# Patient Record
Sex: FEMALE | Race: BLACK OR AFRICAN AMERICAN | Employment: FULL TIME | URBAN - METROPOLITAN AREA
[De-identification: names, ages, dates, MRNs, and addresses within clinical notes are randomized per-mention and may not be internally consistent; named-entity substitution may affect disease eponyms.]

---

## 2019-03-04 ENCOUNTER — OFFICE VISIT (OUTPATIENT)
Dept: ORTHOPEDIC SURGERY | Age: 44
End: 2019-03-04
Payer: COMMERCIAL

## 2019-03-04 VITALS — DIASTOLIC BLOOD PRESSURE: 89 MMHG | SYSTOLIC BLOOD PRESSURE: 136 MMHG | WEIGHT: 183 LBS | HEART RATE: 86 BPM

## 2019-03-04 DIAGNOSIS — S63.642A SPRAIN OF METACARPOPHALANGEAL (MCP) JOINT OF LEFT THUMB, INITIAL ENCOUNTER: ICD-10-CM

## 2019-03-04 DIAGNOSIS — M79.645 PAIN OF FINGER OF LEFT HAND: Primary | ICD-10-CM

## 2019-03-04 PROCEDURE — 99203 OFFICE O/P NEW LOW 30 MIN: CPT | Performed by: ORTHOPAEDIC SURGERY

## 2019-04-15 ENCOUNTER — OFFICE VISIT (OUTPATIENT)
Dept: ORTHOPEDIC SURGERY | Age: 44
End: 2019-04-15
Payer: COMMERCIAL

## 2019-04-15 VITALS — BODY MASS INDEX: 33.13 KG/M2 | WEIGHT: 180 LBS | HEIGHT: 62 IN

## 2019-04-15 DIAGNOSIS — S63.642A SPRAIN OF METACARPOPHALANGEAL (MCP) JOINT OF LEFT THUMB, INITIAL ENCOUNTER: Primary | ICD-10-CM

## 2019-04-15 DIAGNOSIS — M79.645 PAIN OF FINGER OF LEFT HAND: ICD-10-CM

## 2019-04-15 PROCEDURE — 99212 OFFICE O/P EST SF 10 MIN: CPT | Performed by: ORTHOPAEDIC SURGERY

## 2019-04-15 NOTE — PROGRESS NOTES
Is returns today for left thumb carpophalangeal sprain. She is doing well. Today she can purchase of up on the exam table without pain. She has no swelling or ecchymosis. His no tenderness medially or laterally. She has no volar tenderness. She has normal pull-through his DIP and has normal motion of the MP. She get her thumb pad down to the proximal wrist crease in both hands. Sensibility is normal on both sides of the thumb. Her medial and lateral stability matches her opposite thumb. Impression resolved left thumb sprain. Plan: She may be discharged from care.

## 2019-08-02 ENCOUNTER — TELEPHONE (OUTPATIENT)
Dept: ORTHOPEDIC SURGERY | Age: 44
End: 2019-08-02

## 2019-08-05 ENCOUNTER — TELEPHONE (OUTPATIENT)
Dept: ORTHOPEDIC SURGERY | Age: 44
End: 2019-08-05

## 2019-08-09 LAB
CANDIDA SPECIES, DNA PROBE: ABNORMAL
GARDNERELLA VAGINALIS, DNA PROBE: ABNORMAL
TRICHOMONAS VAGINALIS DNA: ABNORMAL

## 2020-05-26 LAB
HPV COMMENT: NORMAL
HPV TYPE 16: NOT DETECTED
HPV TYPE 18: NOT DETECTED
HPVOH (OTHER TYPES): NOT DETECTED

## 2020-07-21 ENCOUNTER — OFFICE VISIT (OUTPATIENT)
Dept: PRIMARY CARE CLINIC | Age: 45
End: 2020-07-21
Payer: COMMERCIAL

## 2020-07-21 PROCEDURE — 99211 OFF/OP EST MAY X REQ PHY/QHP: CPT | Performed by: NURSE PRACTITIONER

## 2020-07-21 NOTE — PROGRESS NOTES
[facility] for [facility]. They were educated on isolation and quarantine as appropriate. For any symptoms, they were directed to seek care from their PCP, given contact information to establish with a doctor, directed to an urgent care or the emergency room.

## 2020-07-21 NOTE — PATIENT INSTRUCTIONS
bags, handling, and disposing of trash. Wash hands after handling or disposing of trash.  Consider consulting with your local health department about trash disposal guidance if available. Information for Household Members and Caregivers of Someone who is Sick   Call ahead before visiting your doctor   Call ahead: If you have a medical appointment, call the healthcare provider and tell them that you have or may have COVID-19. This will help the healthcare provider's office take steps to keep other people from getting infected or exposed. Wear a facemask if you are sick   ; If you are sick: You should wear a facemask when you are around other people (e.g., sharing a room or vehicle) or pets and before you enter a healthcare provider's office. ; If you are caring for others: If the person who is sick is not able to wear a facemask (for example, because it causes trouble breathing), then people who live with the person who is sick should not stay in the same room with them, or they should wear a facemask if they enter a room with the person who is sick. Cover your coughs and sneezes   ; Cover: Cover your mouth and nose with a tissue when you cough or sneeze.   ; Dispose: Throw used tissues in a lined trash can.   ; Wash hands: Immediately wash your hands with soap and water for at least 20 seconds or, if soap and water are not available, clean your hands with an alcohol-based hand  that contains at least 60% alcohol. Clean your hands often   ;  Wash hands: Wash your hands often with soap and water for at least 20 seconds, especially after blowing your nose, coughing, or sneezing; going to the bathroom; and before eating or preparing food.   ; Hand : If soap and water are not readily available, use an alcohol-based hand  with at least 60% alcohol, covering all surfaces of your hands and rubbing them together until they feel dry.   ; Soap and water: Soap and water are the best option if hands are visibly dirty.   ; Avoid touching: Avoid touching your eyes, nose, and mouth with unwashed hands. Handwashing Tips   ; Wet your hands with clean, running water (warm or cold), turn off the tap, and apply soap.  ; Lather your hands by rubbing them together with the soap. Lather the backs of your hands, between your fingers, and under your nails. ; Scrub your hands for at least 20 seconds. Need a timer? Hum the Helen from beginning to end twice.  ; Rinse your hands well under clean, running water.  ; Dry your hands using a clean towel or air dry them. Avoid sharing personal household items   ; Do not share: You should not share dishes, drinking glasses, cups, eating utensils, towels, or bedding with other people or pets in your home.   ; Wash thoroughly after use: After using these items, they should be washed thoroughly with soap and water. Clean all high-touch surfaces everyday   ; Clean and disinfect: Practice routine cleaning of high touch surfaces.  ; High touch surfaces include counters, tabletops, doorknobs, bathroom fixtures, toilets, phones, keyboards, tablets, and bedside tables.  ; Disinfect areas with bodily fluids: Also, clean any surfaces that may have blood, stool, or body fluids on them.   ; Household : Use a household cleaning spray or wipe, according to the label instructions. Labels contain instructions for safe and effective use of the cleaning product including precautions you should take when applying the product, such as wearing gloves and making sure you have good ventilation during use of the product.     Monitor your symptoms   Seek medical attention: Seek prompt medical attention if your illness is worsening     (e.g., difficulty breathing).   ; Call your doctor: Before seeking care, call your healthcare provider and tell them that you have, or are being evaluated for, COVID-19.   ; Wear a facemask when sick: Put on a facemask before you enter the facility. These steps will help the healthcare provider's office to keep other people in the office or waiting room from getting infected or exposed. ; Alert health department: Ask your healthcare provider to call the local or state health department. Persons who are placed under active monitoring or facilitated self-monitoring should follow instructions provided by their local health department or occupational health professionals, as appropriate.  ; Call 911 if you have a medical emergency: If you have a medical emergency and need to call 911, notify the dispatch personnel that you have, or are being evaluated for COVID-19. If possible, put on a facemask before emergency medical services arrive. Thank you for enrolling in [address] [facility] ([place]). Please follow the instructions below to securely access your online medical record. [employer] allows you to send messages to your doctor, view your test results, renew your prescriptions, schedule appointments, and more. How Do I Sign Up? 1. In your Internet browser, go to [email or web address]  2. Click on the Sign Up Now link in the Sign In box. You will see the New Member Sign Up page. 3. Enter your [employer] Access Code exactly as it appears below. You will not need to use this code after youve completed the sign-up process. If you do not sign up before the expiration date, you must request a new code. [employer] Access Code: [email or web address]  Expires: 9/4/2020 11:56 AM    4. Enter your Social Security Number ([id number]) and Date of Birth (mm/dd/yyyy) as indicated and click Submit. You will be taken to the next sign-up page. 5. Create a [employer] ID. This will be your [employer] login ID and cannot be changed, so think of one that is secure and easy to remember. 6. Create a [employer] password. You can change your password at any time. 7. Enter your Password Reset Question and Answer. This can be used at a later time if you forget your password.    8. Enter your e-mail address. You will receive e-mail notification when new information is available in [address] 9. Click Sign Up. You can now view your medical record. Additional Information  If you have questions, please contact your physician practice where you receive care. Remember, MyChart is NOT to be used for urgent needs. For medical emergencies, dial 911.

## 2020-07-28 LAB
SARS-COV-2: NOT DETECTED
SOURCE: NORMAL

## 2021-02-01 ENCOUNTER — OFFICE VISIT (OUTPATIENT)
Dept: ORTHOPEDIC SURGERY | Age: 46
End: 2021-02-01
Payer: COMMERCIAL

## 2021-02-01 VITALS — HEIGHT: 62 IN | BODY MASS INDEX: 33.13 KG/M2 | TEMPERATURE: 98 F | WEIGHT: 180 LBS

## 2021-02-01 DIAGNOSIS — M65.312 TRIGGER THUMB OF LEFT HAND: ICD-10-CM

## 2021-02-01 DIAGNOSIS — M79.645 PAIN OF LEFT THUMB: Primary | ICD-10-CM

## 2021-02-01 PROCEDURE — 99203 OFFICE O/P NEW LOW 30 MIN: CPT | Performed by: ORTHOPAEDIC SURGERY

## 2021-02-01 RX ORDER — DICLOFENAC SODIUM 75 MG/1
75 TABLET, DELAYED RELEASE ORAL 2 TIMES DAILY
Qty: 60 TABLET | Refills: 3 | Status: SHIPPED | OUTPATIENT
Start: 2021-02-01 | End: 2022-04-05

## 2021-02-01 NOTE — PROGRESS NOTES
[facility]  Office Visit  New Patient  Date:  2021    Name:  Damien Nath  Address:  [place] [address]    :  1975      Age:   39 y.o.    SSN:  [id number]      Medical Record Number:  [id number]    Chief Complaint:    Left thumb pain    HPI:   Damien Nath is a 39 y.o. female who presents for evaluation of her left thumb. Patient states she is had left thumb pain for approximately 2 weeks. Locates the pain to the palmar aspect of the base of the thumb. Denies any numbness or tingling. Denies any new injuries. She states her symptoms of pain and catching are more prominent in the morning. This seems to improve as she progresses throughout the day. She is not had any treatment for this. .      She denies any new numbness, tingling, fevers, chills, chest pain, shortness of breath, or any other new significant symptoms. Pain Assessment  Location of Pain: (thumb)  Location Modifiers: Left  Severity of Pain: 2  Quality of Pain: Aching  Duration of Pain: Persistent  Frequency of Pain: Intermittent  Date Pain First Started: 19  Aggravating Factors: Bending, Stretching, Straightening  Limiting Behavior: Some  Relieving Factors: Rest  Result of Injury: No  Work-Related Injury: No  Are there other pain locations you wish to document?: No    Past History:  No past medical history on file. No past surgical history on file. Social History     Tobacco Use    Smoking status: Never Smoker    Smokeless tobacco: Never Used   Substance Use Topics    Alcohol use: Not on file    Drug use: Not on file        Family History:  family history is not on file.          Current Outpatient Medications:     diclofenac (VOLTAREN) 75 MG EC tablet, Take 1 tablet by mouth 2 times daily, Disp: 60 tablet, Rfl: 3      No Known Allergies Review of Systems: A 14 point review of systems available in the scanned medical record as documented by the patient on 2/1/21. Today's review pertinent items are noted in HPI. No notes on file    Physical Exam:  Temp 98 °F (36.7 °C)   Ht 5' 2\" (1.575 m)   Wt 180 lb (81.6 kg)   BMI 32.92 kg/m²       General: No acute distress, well nourished  CV: No obvious peripheral edema. Normal peripheral pulses  Neuro: Alert & oriented x 3  Psych: Normal mood and affect    Left hand exam    Grossly patient has no obvious deformity  Patient does have tenderness palpation over the A1 pulley with a palpable nodule  Patient does have mild triggering with passive flexion of the IP joint  Negative pain with grind test  Thumb stable with varus valgus testing        Radiographic:  X-rays obtained and reviewed in office, reviewed and interpreted by me today:  Views: 2 views  Location: Left thumb  Impression: No obvious bony abnormalities, no arthritis noted. Assessment:  July Ariza is a 39 y.o. female with:  1. Left trigger thumb    Impression:  Encounter Diagnoses   Name Primary?  Trigger thumb of left hand     Pain of left thumb Yes       Office Procedures:  Orders Placed This Encounter   Procedures    XR FINGER LEFT (MIN 2 VIEWS)     Standing Status:   Future     Number of Occurrences:   1     Standing Expiration Date:   2/1/2022     Order Specific Question:   Reason for exam:     Answer:   Pain       Plan:   Pertinent imaging was reviewed. The etiology, natural history, and treatment options for the disorder were discussed. The roles of activity modifications, medications, injections, physical therapy, and surgical interventions were all described to the patient and questions were answered. X-rays reviewed with patient. Patient does have a left trigger thumb with a palpable nodule noted at the A1 pulley. Did state that it is mild at this point and early on we would recommend a 3 to 4-week course of oral anti-inflammatories. Diclofenac 75 twice daily was sent to her pharmacy. Would like to see her back in 3 to 4 weeks for repeat clinical exam.  If she continues to have pain and triggering at that time we would likely provide a steroid injection. [address] Fellow  Attestation:  I was physically present and performed my own examination of this patient and have discussed the case, including pertinent history and exam findings with the fellow. I agree with the documented assessment and plan. Shabbir Hendrix. Aleja Harris MD    The encounter with Oskar Childress was supervised by Dr Aleja Harris who personally examined the patient and reviewed the plan. This dictation was performed with a verbal recognition program (DRAGON) and it was checked for errors. It is possible that there are still dictated errors within this office note. If so, please bring any errors to my attention for an addendum. All efforts were made to ensure that this office note is accurate.

## 2021-03-08 ENCOUNTER — OFFICE VISIT (OUTPATIENT)
Dept: ORTHOPEDIC SURGERY | Age: 46
End: 2021-03-08
Payer: COMMERCIAL

## 2021-03-08 VITALS — HEIGHT: 62 IN | TEMPERATURE: 97.6 F | BODY MASS INDEX: 33.13 KG/M2 | WEIGHT: 180 LBS

## 2021-03-08 DIAGNOSIS — M65.312 TRIGGER THUMB OF LEFT HAND: Primary | ICD-10-CM

## 2021-03-08 PROCEDURE — 99212 OFFICE O/P EST SF 10 MIN: CPT | Performed by: ORTHOPAEDIC SURGERY

## 2021-03-08 NOTE — PROGRESS NOTES
Patient returns today for follow-up of a left trigger thumb. She did not wish to have the injection last time so we placed her on diclofenac twice a day and her symptoms are improving quite a bit. She says it still triggers I although I can get her to trigger today nodule still palpable but is much less tender. She has most aching in her thumb is gone. ROS: Pertinent items are noted in HPI. No notes on file    History reviewed. No pertinent past medical history. History reviewed. No pertinent surgical history. History reviewed. No pertinent family history.       Social History    Socioeconomic History      Marital status: Single      Spouse name: None      Number of children: None      Years of education: None      Highest education level: None    Occupational History      None    Social Needs      Financial resource strain: None      Food insecurity        Worry: None        Inability: None      Transportation needs        Medical: None        Non-medical: None    Tobacco Use      Smoking status: Never Smoker      Smokeless tobacco: Never Used    Substance and Sexual Activity      Alcohol use: None      Drug use: None      Sexual activity: None    Lifestyle      Physical activity        Days per week: None        Minutes per session: None      Stress: None    Relationships      Social connections        Talks on phone: None        Gets together: None        Attends [employer] service: None        Active member of club or organization: None        Attends meetings of clubs or organizations: None        Relationship status: None      Intimate partner violence        Fear of current or ex partner: None        Emotionally abused: None        Physically abused: None        Forced sexual activity: None    Other Topics      Concerns:        None    Social History Narrative      None      Current Outpatient Medications:  diclofenac (VOLTAREN) 75 MG EC tablet, Take 1 tablet by mouth 2 times daily, Disp: 60 tablet, Rfl: 3    No current facility-administered medications for this visit. No Known Allergies    VITAL SIGNS:  Temp 97.6 °F (36.4 °C)   Ht 5' 2\" (1.575 m)   Wt 180 lb (81.6 kg)   BMI 32.92 kg/m²   We discussed the options and offered her injection today but she would like to continue with oral anti-inflammatories she is seeing improvement. We will see her back in 4 weeks and if she still having troubles we will probably inject her at that time.

## 2021-03-09 ENCOUNTER — OFFICE VISIT (OUTPATIENT)
Dept: PRIMARY CARE CLINIC | Age: 46
End: 2021-03-09
Payer: COMMERCIAL

## 2021-03-09 VITALS
HEIGHT: 62 IN | RESPIRATION RATE: 16 BRPM | HEART RATE: 79 BPM | OXYGEN SATURATION: 100 % | TEMPERATURE: 98 F | DIASTOLIC BLOOD PRESSURE: 80 MMHG | BODY MASS INDEX: 35.04 KG/M2 | WEIGHT: 190.4 LBS | SYSTOLIC BLOOD PRESSURE: 128 MMHG

## 2021-03-09 DIAGNOSIS — R82.998 LEUKOCYTES IN URINE: ICD-10-CM

## 2021-03-09 DIAGNOSIS — Z00.00 ANNUAL PHYSICAL EXAM: Primary | ICD-10-CM

## 2021-03-09 DIAGNOSIS — G47.9 DIFFICULTY SLEEPING: ICD-10-CM

## 2021-03-09 DIAGNOSIS — Z12.31 ENCOUNTER FOR SCREENING MAMMOGRAM FOR BREAST CANCER: ICD-10-CM

## 2021-03-09 DIAGNOSIS — D50.9 IRON DEFICIENCY ANEMIA, UNSPECIFIED IRON DEFICIENCY ANEMIA TYPE: ICD-10-CM

## 2021-03-09 DIAGNOSIS — Z13.1 SCREENING FOR DIABETES MELLITUS: ICD-10-CM

## 2021-03-09 LAB
BILIRUBIN, POC: NORMAL
BLOOD URINE, POC: NORMAL
CLARITY, POC: CLEAR
COLOR, POC: YELLOW
GLUCOSE URINE, POC: NORMAL
KETONES, POC: NORMAL
LEUKOCYTE EST, POC: NORMAL
NITRITE, POC: NORMAL
PH, POC: 6
PROTEIN, POC: NORMAL
SPECIFIC GRAVITY, POC: 1.03
UROBILINOGEN, POC: 0.2

## 2021-03-09 PROCEDURE — 99386 PREV VISIT NEW AGE 40-64: CPT | Performed by: INTERNAL MEDICINE

## 2021-03-09 PROCEDURE — 81002 URINALYSIS NONAUTO W/O SCOPE: CPT | Performed by: INTERNAL MEDICINE

## 2021-03-09 RX ORDER — LANOLIN ALCOHOL/MO/W.PET/CERES
3 CREAM (GRAM) TOPICAL NIGHTLY
COMMUNITY
Start: 2021-03-09 | End: 2022-04-05

## 2021-03-09 ASSESSMENT — ENCOUNTER SYMPTOMS
COUGH: 0
FACIAL SWELLING: 0
ABDOMINAL DISTENTION: 0
VOICE CHANGE: 0
RECTAL PAIN: 0
SINUS PAIN: 0
NAUSEA: 0
VOMITING: 0
EYE PAIN: 0
DIARRHEA: 0
BACK PAIN: 0
RHINORRHEA: 0
SINUS PRESSURE: 0
ABDOMINAL PAIN: 0
CHEST TIGHTNESS: 0
PHOTOPHOBIA: 0
WHEEZING: 0
EYE DISCHARGE: 0
CHOKING: 0
SORE THROAT: 0
CONSTIPATION: 0
SHORTNESS OF BREATH: 0
EYE ITCHING: 0
APNEA: 0
ANAL BLEEDING: 0
TROUBLE SWALLOWING: 0
EYE REDNESS: 0
BLOOD IN STOOL: 0

## 2021-03-09 ASSESSMENT — PATIENT HEALTH QUESTIONNAIRE - PHQ9
SUM OF ALL RESPONSES TO PHQ QUESTIONS 1-9: 0
SUM OF ALL RESPONSES TO PHQ QUESTIONS 1-9: 0
1. LITTLE INTEREST OR PLEASURE IN DOING THINGS: 0
2. FEELING DOWN, DEPRESSED OR HOPELESS: 0
SUM OF ALL RESPONSES TO PHQ QUESTIONS 1-9: 0

## 2021-03-09 NOTE — PROGRESS NOTES
Lilia Rowley   YOB: 1975    Date of Visit:  3/9/2021    Chief Complaint   Patient presents with   [facility] Annual Exam       HPI  Patient presents for annual physical exam. Pap smear done on 5/20/20. LMP 2/13/21. Iron deficiency anemia- Patient states she had anemia 3 years. Patient takes otc iron 1 tablet daily. Patient tries to increase green vegetables. Patient states she was Vegan for 3 years and started eating chicken and red meat again 1 year ago. Patient is not exercising. Patient has difficulty sleeping. 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 Patient states she wakes up 2:00-3:00am every morning. Patient states it takes 1-2 hours to fall back asleep. Patient states sleeps 6 hours per night. Review of Systems   Constitutional: Positive for unexpected weight change (weight gain since Covid pandemic with decrease in activity and eating more). Negative for activity change, appetite change, chills, diaphoresis, fatigue and fever. HENT: Negative for congestion, ear discharge, ear pain, facial swelling, hearing loss, mouth sores, nosebleeds, postnasal drip, rhinorrhea, sinus pressure, sinus pain, sneezing, sore throat, tinnitus, trouble swallowing and voice change. Eyes: Negative for photophobia, pain, discharge, redness, itching and visual disturbance. Eyes burning a month ago with work being done on her house and not since   Respiratory: Negative for apnea, cough, choking, chest tightness, shortness of breath and wheezing. Cardiovascular: Negative for chest pain, palpitations and leg swelling. Gastrointestinal: Negative for abdominal distention, abdominal pain, anal bleeding, blood in stool, constipation, diarrhea, nausea, rectal pain and vomiting. Endocrine: Positive for cold intolerance. Negative for heat intolerance.    Genitourinary: Negative for decreased urine volume, difficulty urinating, dysuria, flank pain, frequency, genital sores, hematuria, menstrual problem, pelvic pain, urgency, vaginal bleeding, vaginal discharge and vaginal pain. Musculoskeletal: Negative for arthralgias, back pain, gait problem, joint swelling, myalgias, neck pain and neck stiffness. Skin: Negative for rash and wound. Neurological: Negative for dizziness, tremors, seizures, syncope, facial asymmetry, speech difficulty, weakness, light-headedness, numbness and headaches. Hematological: Negative for adenopathy. Bruises/bleeds easily (bruises easily). Psychiatric/Behavioral: Positive for sleep disturbance (worked night shift for 20 years and now working day shift sleep is a little off wakes up at 2-3 am every morning). Negative for decreased concentration and dysphoric mood. The patient is not nervous/anxious. All other systems reviewed and are negative. Past Medical History:   Diagnosis Date    Iron deficiency anemia        History reviewed. No pertinent surgical history. Outpatient Medications Marked as Taking for the 3/9/21 encounter (Office Visit) with Elvis Robledo MD   Medication Sig Dispense Refill    melatonin 3 MG TABS tablet Take 1 tablet by mouth nightly      diclofenac (VOLTAREN) 75 MG EC tablet Take 1 tablet by mouth 2 times daily 60 tablet 3         No Known Allergies    Social History     Tobacco Use    Smoking status: Never Smoker    Smokeless tobacco: Never Used   Substance Use Topics    Alcohol use: Yes     Comment: Occasional       Family History   Problem Relation Age of Onset    Pacemaker Mother     High Blood Pressure Mother     Sickle Cell Anemia Other         nephew has sickle cell    Diabetes Other         diabetes runs in family on mother's side    Cancer Brother 55        blood cancer    Brain Cancer Maternal Cousin 40         There is no immunization history on file for this patient.     Vitals:    03/09/21 1506   BP: 128/80   Pulse: 79   Resp: 16   Temp: 98 °F (36.7 °C)   SpO2: 100%   Weight: 190 lb 6.4 oz (86.4 kg)   Height: 5' 2\" (1.575 m)     Body mass index is 34.82 kg/m². Physical Exam  Constitutional:       General: She is not in acute distress. Appearance: Normal appearance. HENT:      Head: Normocephalic and atraumatic. Right Ear: Tympanic membrane, ear canal and external ear normal.      Left Ear: Tympanic membrane, ear canal and external ear normal.      Nose: Nose normal.   Eyes:      General: Lids are normal.      Extraocular Movements: Extraocular movements intact. Conjunctiva/sclera: Conjunctivae normal.      Pupils: Pupils are equal, round, and reactive to light. Neck:      Musculoskeletal: Neck supple. Thyroid: No thyromegaly. Vascular: No carotid bruit. Cardiovascular:      Rate and Rhythm: Normal rate and regular rhythm. Heart sounds: Normal heart sounds, S1 normal and S2 normal. No murmur. Pulmonary:      Effort: Pulmonary effort is normal.      Breath sounds: Normal breath sounds. Abdominal:      General: Bowel sounds are normal.      Palpations: Abdomen is soft. There is no hepatomegaly or splenomegaly. Tenderness: There is no abdominal tenderness. Genitourinary:     Comments: deferred  Musculoskeletal:      Right shoulder: She exhibits normal range of motion and no tenderness. Left shoulder: She exhibits normal range of motion and no tenderness. Right elbow: No tenderness found. Left elbow: No tenderness found. Right wrist: She exhibits no tenderness and no swelling. Left wrist: She exhibits no tenderness and no swelling. Right hip: She exhibits no tenderness. Left hip: She exhibits no tenderness. Right knee: No tenderness found. Left knee: No tenderness found. Right ankle: She exhibits no swelling. No tenderness. Left ankle: She exhibits no swelling. No tenderness. Cervical back: She exhibits normal range of motion and no tenderness. Thoracic back: She exhibits no tenderness. Lumbar back: She exhibits normal range of motion and no tenderness. Right hand: She exhibits no tenderness and no swelling. Left hand: She exhibits no tenderness and no swelling. Right lower leg: No edema. Left lower leg: No edema. Right foot: No swelling. Left foot: No swelling. Lymphadenopathy:      Head:      Right side of head: No submandibular adenopathy. Left side of head: No submandibular adenopathy. Skin:     General: Skin is warm and dry. Neurological:      Mental Status: She is alert and oriented to person, place, and time. Gait: Gait normal.      Deep Tendon Reflexes: Reflexes are normal and symmetric. Psychiatric:         Attention and Perception: Attention normal.         Mood and Affect: Mood normal.         Speech: Speech normal.               Results for POC orders placed in visit on 03/09/21   POCT Urinalysis no Micro   Result Value Ref Range    Color, UA yellow     Clarity, UA clear     Glucose, UA POC neg     Bilirubin, UA neg     Ketones, UA neg     Spec Grav, UA 1.030     Blood, UA POC small     pH, UA 6.0     Protein, UA POC neg     Urobilinogen, UA 0.2     Leukocytes, UA trace     Nitrite, UA neg        Lab Review not applicable    Assessment/Plan     1. Annual physical exam  - POCT Urinalysis no Micro  - CBC Auto Differential; Future  - Comprehensive Metabolic Panel; Future  - Lipid Panel; Future  - TSH without Reflex; Future  - Pap smear done on 5/20/20  - Have a screening mammogram done  - Tdap done on 3/29/13  - Regular aerobic exercise    2. Iron deficiency anemia, unspecified iron deficiency anemia type  - continue iron once daily  - continue iron rich diet  - CBC Auto Differential; Future  - Iron and TIBC; Future  - FERRITIN; Future    3. Difficulty sleeping  - melatonin 3 MG TABS tablet; Take 1 tablet by mouth nightly 30 minutes prior to bedtime    4.  Leukocytes in urine  - Culture, Urine 5. Screening for diabetes mellitus  - Hemoglobin A1C; Future    6. Encounter for screening mammogram for breast cancer  - [facility] DIGITAL SCREEN W OR WO CAD BILATERAL;  Future        Return in about 1 year (around 3/9/2022) for annual physical exam.

## 2021-03-09 NOTE — PATIENT INSTRUCTIONS
1. Annual physical exam  - POCT Urinalysis no Micro  - CBC Auto Differential; Future  - Comprehensive Metabolic Panel; Future  - Lipid Panel; Future  - TSH without Reflex; Future  - Pap smear done on 5/20/20  - Have a screening mammogram done  - Tdap done on 3/29/13  - Regular aerobic exercise    2. Iron deficiency anemia, unspecified iron deficiency anemia type  - continue iron once daily  - continue iron rich diet  - CBC Auto Differential; Future  - Iron and TIBC; Future  - FERRITIN; Future    3. Difficulty sleeping  - melatonin 3 MG TABS tablet; Take 1 tablet by mouth nightly 30 minutes prior to bedtime    4. Leukocytes in urine  - Culture, Urine    5. Screening for diabetes mellitus  - Hemoglobin A1C; Future    6. Encounter for screening mammogram for breast cancer  - FRANSISCA DIGITAL SCREEN W OR WO CAD BILATERAL; Future        Patient Education        Well Visit, Ages 25 to 48: Care Instructions  Your Care Instructions     Physical exams can help you stay healthy. Your doctor has checked your overall health and may have suggested ways to take good care of yourself. He or she also may have recommended tests. At home, you can help prevent illness with healthy eating, regular exercise, and other steps. Follow-up care is a key part of your treatment and safety. Be sure to make and go to all appointments, and call your doctor if you are having problems. It's also a good idea to know your test results and keep a list of the medicines you take. How can you care for yourself at home? · Reach and stay at a healthy weight. This will lower your risk for many problems, such as obesity, diabetes, heart disease, and high blood pressure. · Get at least 30 minutes of physical activity on most days of the week. Walking is a good choice. You also may want to do other activities, such as running, swimming, cycling, or playing tennis or team sports. Discuss any changes in your exercise program with your doctor.   · Do not smoke or allow others to smoke around you. If you need help quitting, talk to your doctor about stop-smoking programs and medicines. These can increase your chances of quitting for good. · Talk to your doctor about whether you have any risk factors for sexually transmitted infections (STIs). Having one sex partner (who does not have STIs and does not have sex with anyone else) is a good way to avoid these infections. · Use birth control if you do not want to have children at this time. Talk with your doctor about the choices available and what might be best for you. · Protect your skin from too much sun. When you're outdoors from 10 a.m. to 4 p.m., stay in the shade or cover up with clothing and a hat with a wide brim. Wear sunglasses that block UV rays. Even when it's cloudy, put broad-spectrum sunscreen (SPF 30 or higher) on any exposed skin. · See a dentist one or two times a year for checkups and to have your teeth cleaned. · Wear a seat belt in the car. Follow your doctor's advice about when to have certain tests. These tests can spot problems early. For everyone  · Cholesterol. Have the fat (cholesterol) in your blood tested after age 21. Your doctor will tell you how often to have this done based on your age, family history, or other things that can increase your risk for heart disease. · Blood pressure. Have your blood pressure checked during a routine doctor visit. Your doctor will tell you how often to check your blood pressure based on your age, your blood pressure results, and other factors. · Vision. Talk with your doctor about how often to have a glaucoma test.  · Diabetes. Ask your doctor whether you should have tests for diabetes. · Colon cancer. Your risk for colorectal cancer gets higher as you get older. Some experts say that adults should start regular screening at age 48 and stop at age 76. Others say to start before age 48 or continue after age 76.  Talk with your doctor about your risk and when to start and stop screening. For women  · Breast exam and mammogram. Talk to your doctor about when you should have a clinical breast exam and a mammogram. Medical experts differ on whether and how often women under 50 should have these tests. Your doctor can help you decide what is right for you. · Cervical cancer screening test and pelvic exam. Begin with a Pap test at age 24. The test often is part of a pelvic exam. Starting at age 27, you may choose to have a Pap test, an HPV test, or both tests at the same time (called co-testing). Talk with your doctor about how often to have testing. · Tests for sexually transmitted infections (STIs). Ask whether you should have tests for STIs. You may be at risk if you have sex with more than one person, especially if your partners do not wear condoms. For men  · Tests for sexually transmitted infections (STIs). Ask whether you should have tests for STIs. You may be at risk if you have sex with more than one person, especially if you do not wear a condom. · Testicular cancer exam. Ask your doctor whether you should check your testicles regularly. · Prostate exam. Talk to your doctor about whether you should have a blood test (called a PSA test) for prostate cancer. Experts differ on whether and when men should have this test. Some experts suggest it if you are older than 39 and are -American or have a father or brother who got prostate cancer when he was younger than 72. When should you call for help? Watch closely for changes in your health, and be sure to contact your doctor if you have any problems or symptoms that concern you. Where can you learn more? Go to https://alma rosa.healthBvents. org and sign in to your [employer] account. Enter P072 in the [facility] box to learn more about \"Well Visit, Ages 25 to 48: Care Instructions. \"     If you do not have an account, please click on the \"Sign Up Now\" link.   Current as of: May 27, 2020               Content Version: 12.6  © 1779-2976 [employer]. Care instructions adapted under license by [facility] [place] ([facility]). If you have questions about a medical condition or this instruction, always ask your healthcare professional. [address] any warranty or liability for your use of this information.

## 2021-03-10 LAB — URINE CULTURE, ROUTINE: NORMAL

## 2021-03-16 PROBLEM — R82.998 LEUKOCYTES IN URINE: Status: ACTIVE | Noted: 2021-03-16

## 2021-03-16 PROBLEM — D50.9 IRON DEFICIENCY ANEMIA: Status: ACTIVE | Noted: 2021-03-16

## 2021-03-16 PROBLEM — G47.9 DIFFICULTY SLEEPING: Status: ACTIVE | Noted: 2021-03-16

## 2021-04-12 ENCOUNTER — OFFICE VISIT (OUTPATIENT)
Dept: ORTHOPEDIC SURGERY | Age: 46
End: 2021-04-12
Payer: COMMERCIAL

## 2021-04-12 VITALS — TEMPERATURE: 98 F | WEIGHT: 190 LBS | BODY MASS INDEX: 34.75 KG/M2

## 2021-04-12 DIAGNOSIS — M65.312 TRIGGER THUMB OF LEFT HAND: Primary | ICD-10-CM

## 2021-04-12 PROCEDURE — 99213 OFFICE O/P EST LOW 20 MIN: CPT | Performed by: ORTHOPAEDIC SURGERY

## 2021-04-12 PROCEDURE — 20550 NJX 1 TENDON SHEATH/LIGAMENT: CPT | Performed by: ORTHOPAEDIC SURGERY

## 2021-04-12 RX ORDER — ROPIVACAINE HYDROCHLORIDE 5 MG/ML
30 INJECTION, SOLUTION EPIDURAL; INFILTRATION; PERINEURAL ONCE
Status: COMPLETED | OUTPATIENT
Start: 2021-04-12 | End: 2021-04-12

## 2021-04-12 RX ORDER — METHYLPREDNISOLONE ACETATE 40 MG/ML
40 INJECTION, SUSPENSION INTRA-ARTICULAR; INTRALESIONAL; INTRAMUSCULAR; SOFT TISSUE ONCE
Status: COMPLETED | OUTPATIENT
Start: 2021-04-12 | End: 2021-04-12

## 2021-04-12 RX ADMIN — METHYLPREDNISOLONE ACETATE 40 MG: 40 INJECTION, SUSPENSION INTRA-ARTICULAR; INTRALESIONAL; INTRAMUSCULAR; SOFT TISSUE at 14:47

## 2021-04-12 RX ADMIN — ROPIVACAINE HYDROCHLORIDE 30 ML: 5 INJECTION, SOLUTION EPIDURAL; INFILTRATION; PERINEURAL at 14:48

## 2021-04-12 NOTE — PROGRESS NOTES
[facility]  Office Visit  Follow up  Date:  2021    Name:  Fernanda Mccray  Address:  [place] [address]    :  1975      Age:   39 y.o.    SSN:  [id number]      Medical Record Number:  [id number]    Chief Complaint:    Follow up for L hand    HPI:   Fernanda Mccray is a 39 y.o. female who is following up for her left hand. She follows for a left trigger thumb. She is right-hand dominant. She has been utilizing diclofenac twice daily which helps with the pain but she continues to experience triggering especially toward the end of the day. She has been avoiding a steroid injection but feels that she needs one at this point in time. She denies any new numbness, tingling, fevers, chills, chest pain, shortness of breath, or any other new significant symptoms. Past History:  Past Medical History:   Diagnosis Date    Iron deficiency anemia        No past surgical history on file. Social History     Tobacco Use    Smoking status: Never Smoker    Smokeless tobacco: Never Used   Substance Use Topics    Alcohol use: Yes     Comment: Occasional    Drug use: Never        Family History:  family history includes Brain Cancer (age of onset: 36) in her maternal cousin; Cancer (age of onset: 55) in her brother; Diabetes in an other family member; High Blood Pressure in her mother; Pacemaker in her mother; Sickle Cell Anemia in an other family member.          Current Outpatient Medications:     melatonin 3 MG TABS tablet, Take 1 tablet by mouth nightly, Disp: , Rfl:     diclofenac (VOLTAREN) 75 MG EC tablet, Take 1 tablet by mouth 2 times daily, Disp: 60 tablet, Rfl: 3      No Known Allergies      Review of Systems: A 14 point review of systems was completed by the patient and is available in the media section of the scanned medical record and was reviewed today  The review is negative with the exception of those things mentioned in the HPI    No notes on file    Physical Exam:  Temp 98 °F (36.7 °C)   Wt 190 lb (86.2 kg)   BMI 34.75 kg/m²       General: No acute distress, well nourished  CV: No obvious peripheral edema. Normal peripheral pulses  Resp: nonlabored respiration, symmetrical chest expansion  Neuro: Alert & oriented x 3  Psych: Appropriate mood and affect    LUE: Nodule palpated at the flexor tendon of the thumb overlying the A1 pulley. Neurovascular intact distally. Skin intact without erythema. Radiographic:  No new imaging    Assessment:  Asa Coker is a 39 y.o. female right-hand-dominant with a left trigger thumb      Office Procedures:  No orders of the defined types were placed in this encounter. Plan:   -Steroid injection provided to left thumb A1 pulley  - She will return in 1 months time for re-evaluation      Procedure:Injection into L thumb A1 pulley   After discussion of the risks, benefits, alternatives to injection, patient agreeable. The skin was cleansed and prepped. Using a 25 gauge needle an injection with 1 mL of 40 mg/ml Depo-Medrol and 1 mL of ropivicaine was injected without difficulty underlying the A1 pulley of the thumb. A total of 0.5 cc of the solution was injected. Sterile dressing was applied. The patient tolerated the procedure well. Gualberto Nixon MD Attestation:  I was physically present and performed my own examination of this patient and have discussed the case, including pertinent history and exam findings with the fellow. I agree with the documented assessment and plan. Devi Russo. [address] [place] [address]      The encounter with Asa Coker was supervised by Dr Ramos [place] Irina who personally examined the patient and reviewed the plan. This dictation was performed with a verbal recognition program (DRAGON) and it was checked for errors.   It is possible that there are still dictated errors within this office note.  If so, please bring any errors to my attention for an addendum. All efforts were made to ensure that this office note is accurate.

## 2021-05-10 ENCOUNTER — OFFICE VISIT (OUTPATIENT)
Dept: ORTHOPEDIC SURGERY | Age: 46
End: 2021-05-10
Payer: COMMERCIAL

## 2021-05-10 VITALS — BODY MASS INDEX: 34.96 KG/M2 | HEIGHT: 62 IN | WEIGHT: 190 LBS

## 2021-05-10 DIAGNOSIS — M65.312 TRIGGER THUMB OF LEFT HAND: Primary | ICD-10-CM

## 2021-05-10 PROCEDURE — 99212 OFFICE O/P EST SF 10 MIN: CPT | Performed by: ORTHOPAEDIC SURGERY

## 2021-06-14 ENCOUNTER — OFFICE VISIT (OUTPATIENT)
Dept: ORTHOPEDIC SURGERY | Age: 46
End: 2021-06-14
Payer: COMMERCIAL

## 2021-06-14 VITALS — BODY MASS INDEX: 34.96 KG/M2 | WEIGHT: 190 LBS | TEMPERATURE: 98.6 F | HEIGHT: 62 IN

## 2021-06-14 DIAGNOSIS — M65.312 TRIGGER THUMB OF LEFT HAND: Primary | ICD-10-CM

## 2021-06-14 PROCEDURE — 20552 NJX 1/MLT TRIGGER POINT 1/2: CPT | Performed by: ORTHOPAEDIC SURGERY

## 2021-06-14 PROCEDURE — 99213 OFFICE O/P EST LOW 20 MIN: CPT | Performed by: ORTHOPAEDIC SURGERY

## 2021-06-14 RX ORDER — ROPIVACAINE HYDROCHLORIDE 5 MG/ML
30 INJECTION, SOLUTION EPIDURAL; INFILTRATION; PERINEURAL ONCE
Status: COMPLETED | OUTPATIENT
Start: 2021-06-14 | End: 2021-06-14

## 2021-06-14 RX ORDER — METHYLPREDNISOLONE ACETATE 40 MG/ML
40 INJECTION, SUSPENSION INTRA-ARTICULAR; INTRALESIONAL; INTRAMUSCULAR; SOFT TISSUE ONCE
Status: COMPLETED | OUTPATIENT
Start: 2021-06-14 | End: 2021-06-14

## 2021-06-14 RX ADMIN — ROPIVACAINE HYDROCHLORIDE 30 ML: 5 INJECTION, SOLUTION EPIDURAL; INFILTRATION; PERINEURAL at 16:11

## 2021-06-14 RX ADMIN — METHYLPREDNISOLONE ACETATE 40 MG: 40 INJECTION, SUSPENSION INTRA-ARTICULAR; INTRALESIONAL; INTRAMUSCULAR; SOFT TISSUE at 16:11

## 2021-06-14 NOTE — PROGRESS NOTES
Patient returns today for left trigger thumb. Injected about 2 months ago and she was relieved until just recently starting to pop again. She works for [employer] and so uses her hands a lot. Says is starting to trigger again. ROS: Pertinent items are noted in HPI. No notes on file    Past Medical History:  No date: Iron deficiency anemia     History reviewed. No pertinent surgical history.     Review of patient's family history indicates:  Problem: Pacemaker      Relation: Mother          Age of Onset: (Not Specified)  Problem: High Blood Pressure      Relation: Mother          Age of Onset: (Not Specified)  Problem: Sickle Cell Anemia      Relation: Other          Age of Onset: (Not Specified)          Comment: nephew has sickle cell  Problem: Diabetes      Relation: Other          Age of Onset: (Not Specified)          Comment: diabetes runs in family on mother's side  Problem: Cancer      Relation: Brother          Age of Onset: 55          Comment: blood cancer  Problem: Brain Cancer      Relation: Maternal Cousin          Age of Onset: 36      Social History    Socioeconomic History      Marital status: Single      Spouse name: None      Number of children: None      Years of education: None      Highest education level: None    Occupational History      None    Tobacco Use      Smoking status: Never Smoker      Smokeless tobacco: Never Used    Substance and Sexual Activity      Alcohol use: Yes        Comment: Occasional      Drug use: Never      Sexual activity: Yes        Partners: Male    Other Topics      Concerns:        None    Social History Narrative      None    Social Determinants of Health  Financial Resource Strain:       Difficulty of Paying Living Expenses:   Food Insecurity:       Worried About Running Out of Food in the Last Year:       Ran Out of Food in the Last Year:   Transportation Needs:       Lack of Transportation (Medical):       Lack of Transportation (Non-Medical):   Physical Activity:       Days of Exercise per Week:       Minutes of Exercise per Session:   Stress:       Feeling of Stress :   Social Connections:       Frequency of Communication with Friends and Family:       Frequency of Social Gatherings with Friends and Family:       Attends Yarsanism Services:       Active Member of Clubs or Organizations:       Attends Club or Organization Meetings:       Marital Status:   Intimate Partner Violence:       Fear of Current or Ex-Partner:       Emotionally Abused:       Physically Abused:       Sexually Abused:     Current Outpatient Medications:  melatonin 3 MG TABS tablet, Take 1 tablet by mouth nightly, Disp: , Rfl:   diclofenac (VOLTAREN) 75 MG EC tablet, Take 1 tablet by mouth 2 times daily, Disp: 60 tablet, Rfl: 3    No current facility-administered medications for this visit. No Known Allergies    VITAL SIGNS:  Temp 98.6 °F (37 °C)   Ht 5' 2\" (1.575 m)   Wt 190 lb (86.2 kg)   BMI 34.75 kg/m²   On examination today she gets full extension as she flexes this begins to hurt and she triggers is a palpable nodule at the MCP flexion crease. She has normal sensibility in this hand. Impression recurrent left trigger thumb. Plan: After sterile prep and ethyl chloride numbing we injected the flexor tendon sheath once again with about 20 mg of Depo-Medrol and 2 and half milligrams of ropivacaine. The patient tolerated this procedure well. We will see her back if this recurs or does not go away she may need an open release.

## 2021-06-14 NOTE — PROGRESS NOTES
Administrations This Visit     methylPREDNISolone acetate (DEPO-MEDROL) injection 40 mg     Admin Date  06/14/2021  16:11 Action  Given Dose  40 mg Route  Intramuscular Site  Finger (See Comments) Administered By  Rob Neumann    Ordering Provider: Linda Jones MD    NDC: 9771-5595-54    Lot#: [id number]    : [address]     Patient Supplied?: No    Comments: Lt Trigger Thumb          ropivacaine (NAROPIN) 0.5% injection 30 mL     Admin Date  06/14/2021  16:11 Action  Given Dose  30 mL Route  Epidural Site  Finger (See Comments) Administered By  Rob Neumann    Ordering Provider: Linda Jones MD    NDC: 29992-786-17    Lot#: [id number]    : [facility]    Patient Supplied?: No    Comments: Lt Trigger Thumb

## 2022-04-05 ENCOUNTER — OFFICE VISIT (OUTPATIENT)
Dept: PRIMARY CARE CLINIC | Age: 47
End: 2022-04-05
Payer: COMMERCIAL

## 2022-04-05 VITALS
OXYGEN SATURATION: 99 % | RESPIRATION RATE: 16 BRPM | DIASTOLIC BLOOD PRESSURE: 82 MMHG | WEIGHT: 170.2 LBS | TEMPERATURE: 97.5 F | HEIGHT: 62 IN | SYSTOLIC BLOOD PRESSURE: 124 MMHG | HEART RATE: 66 BPM | BODY MASS INDEX: 31.32 KG/M2

## 2022-04-05 DIAGNOSIS — Z13.1 SCREENING FOR DIABETES MELLITUS: ICD-10-CM

## 2022-04-05 DIAGNOSIS — Z00.00 ANNUAL PHYSICAL EXAM: Primary | ICD-10-CM

## 2022-04-05 DIAGNOSIS — Z12.31 ENCOUNTER FOR SCREENING MAMMOGRAM FOR BREAST CANCER: ICD-10-CM

## 2022-04-05 DIAGNOSIS — F51.01 PRIMARY INSOMNIA: ICD-10-CM

## 2022-04-05 DIAGNOSIS — D50.9 IRON DEFICIENCY ANEMIA, UNSPECIFIED IRON DEFICIENCY ANEMIA TYPE: ICD-10-CM

## 2022-04-05 LAB
BACTERIA: ABNORMAL /HPF
BILIRUBIN URINE: NEGATIVE
BLOOD, URINE: NEGATIVE
CLARITY: ABNORMAL
COLOR: ABNORMAL
COMMENT UA: ABNORMAL
CRYSTALS, UA: ABNORMAL /HPF
EPITHELIAL CELLS, UA: 25 /HPF (ref 0–5)
GLUCOSE URINE: NEGATIVE MG/DL
HYALINE CASTS: 8 /LPF (ref 0–8)
KETONES, URINE: NEGATIVE MG/DL
LEUKOCYTE ESTERASE, URINE: ABNORMAL
MICROSCOPIC EXAMINATION: YES
MUCUS: ABNORMAL /LPF
NITRITE, URINE: NEGATIVE
PH UA: 6 (ref 5–8)
PROTEIN UA: ABNORMAL MG/DL
RBC UA: ABNORMAL /HPF (ref 0–4)
SPECIFIC GRAVITY UA: >=1.03 (ref 1–1.03)
URINE TYPE: ABNORMAL
UROBILINOGEN, URINE: 0.2 E.U./DL
WBC UA: 6 /HPF (ref 0–5)

## 2022-04-05 PROCEDURE — 99396 PREV VISIT EST AGE 40-64: CPT | Performed by: INTERNAL MEDICINE

## 2022-04-05 PROCEDURE — 81003 URINALYSIS AUTO W/O SCOPE: CPT | Performed by: INTERNAL MEDICINE

## 2022-04-05 RX ORDER — FLUTICASONE PROPIONATE 50 MCG
1 SPRAY, SUSPENSION (ML) NASAL PRN
COMMUNITY

## 2022-04-05 SDOH — ECONOMIC STABILITY: FOOD INSECURITY: WITHIN THE PAST 12 MONTHS, YOU WORRIED THAT YOUR FOOD WOULD RUN OUT BEFORE YOU GOT MONEY TO BUY MORE.: NEVER TRUE

## 2022-04-05 SDOH — ECONOMIC STABILITY: FOOD INSECURITY: WITHIN THE PAST 12 MONTHS, THE FOOD YOU BOUGHT JUST DIDN'T LAST AND YOU DIDN'T HAVE MONEY TO GET MORE.: NEVER TRUE

## 2022-04-05 ASSESSMENT — ENCOUNTER SYMPTOMS
EYE DISCHARGE: 0
BLOOD IN STOOL: 0
ANAL BLEEDING: 0
CHOKING: 0
EYE PAIN: 0
RECTAL PAIN: 0
VOMITING: 0
SORE THROAT: 0
ABDOMINAL PAIN: 0
SHORTNESS OF BREATH: 0
VOICE CHANGE: 0
CONSTIPATION: 0
DIARRHEA: 0
RHINORRHEA: 0
WHEEZING: 0
ABDOMINAL DISTENTION: 0
CHEST TIGHTNESS: 0
TROUBLE SWALLOWING: 0
COUGH: 0
EYE ITCHING: 0
PHOTOPHOBIA: 0
NAUSEA: 0
BACK PAIN: 0
APNEA: 0
SINUS PRESSURE: 0
FACIAL SWELLING: 0
SINUS PAIN: 0
EYE REDNESS: 0

## 2022-04-05 ASSESSMENT — PATIENT HEALTH QUESTIONNAIRE - PHQ9
4. FEELING TIRED OR HAVING LITTLE ENERGY: 1
6. FEELING BAD ABOUT YOURSELF - OR THAT YOU ARE A FAILURE OR HAVE LET YOURSELF OR YOUR FAMILY DOWN: 0
9. THOUGHTS THAT YOU WOULD BE BETTER OFF DEAD, OR OF HURTING YOURSELF: 0
7. TROUBLE CONCENTRATING ON THINGS, SUCH AS READING THE NEWSPAPER OR WATCHING TELEVISION: 0
SUM OF ALL RESPONSES TO PHQ QUESTIONS 1-9: 5
5. POOR APPETITE OR OVEREATING: 0
2. FEELING DOWN, DEPRESSED OR HOPELESS: 1
3. TROUBLE FALLING OR STAYING ASLEEP: 2
10. IF YOU CHECKED OFF ANY PROBLEMS, HOW DIFFICULT HAVE THESE PROBLEMS MADE IT FOR YOU TO DO YOUR WORK, TAKE CARE OF THINGS AT HOME, OR GET ALONG WITH OTHER PEOPLE: 0
SUM OF ALL RESPONSES TO PHQ QUESTIONS 1-9: 5
SUM OF ALL RESPONSES TO PHQ9 QUESTIONS 1 & 2: 2
SUM OF ALL RESPONSES TO PHQ QUESTIONS 1-9: 5
SUM OF ALL RESPONSES TO PHQ QUESTIONS 1-9: 5
8. MOVING OR SPEAKING SO SLOWLY THAT OTHER PEOPLE COULD HAVE NOTICED. OR THE OPPOSITE, BEING SO FIGETY OR RESTLESS THAT YOU HAVE BEEN MOVING AROUND A LOT MORE THAN USUAL: 0
1. LITTLE INTEREST OR PLEASURE IN DOING THINGS: 1

## 2022-04-05 ASSESSMENT — SOCIAL DETERMINANTS OF HEALTH (SDOH): HOW HARD IS IT FOR YOU TO PAY FOR THE VERY BASICS LIKE FOOD, HOUSING, MEDICAL CARE, AND HEATING?: SOMEWHAT HARD

## 2022-04-05 NOTE — PATIENT INSTRUCTIONS
Well Visit, Ages 25 to 48: Care Instructions  Overview     Well visits can help you stay healthy. Your doctor has checked your overall health and may have suggested ways to take good care of yourself. Your doctor also may have recommended tests. At home, you can help prevent illness withhealthy eating, regular exercise, and other steps. Follow-up care is a key part of your treatment and safety. Be sure to make and go to all appointments, and call your doctor if you are having problems. It's also a good idea to know your test results and keep alist of the medicines you take. How can you care for yourself at home?  Get screening tests that you and your doctor decide on. Screening helps find diseases before any symptoms appear.  Eat healthy foods. Choose fruits, vegetables, whole grains, protein, and low-fat dairy foods. Limit fat, especially saturated fat. Reduce salt in your diet.  Limit alcohol. If you are a man, have no more than 2 drinks a day or 14 drinks a week. If you are a woman, have no more than 1 drink a day or 7 drinks a week.  Get at least 30 minutes of physical activity on most days of the week. Walking is a good choice. You also may want to do other activities, such as running, swimming, cycling, or playing tennis or team sports. Discuss any changes in your exercise program with your doctor.  Reach and stay at a healthy weight. This will lower your risk for many problems, such as obesity, diabetes, heart disease, and high blood pressure.  Do not smoke or allow others to smoke around you. If you need help quitting, talk to your doctor about stop-smoking programs and medicines. These can increase your chances of quitting for good.  Care for your mental health. It is easy to get weighed down by worry and stress. Learn strategies to manage stress, like deep breathing and mindfulness, and stay connected with your family and community.  If you find you often feel sad or hopeless, talk with your doctor. Treatment can help.  Talk to your doctor about whether you have any risk factors for sexually transmitted infections (STIs). You can help prevent STIs if you wait to have sex with a new partner (or partners) until you've each been tested for STIs. It also helps if you use condoms (male or female condoms) and if you limit your sex partners to one person who only has sex with you. Vaccines are available for some STIs, such as HPV.  Use birth control if it's important to you to prevent pregnancy. Talk with your doctor about the choices available and what might be best for you.  If you think you may have a problem with alcohol or drug use, talk to your doctor. This includes prescription medicines (such as amphetamines and opioids) and illegal drugs (such as cocaine and methamphetamine). Your doctor can help you figure out what type of treatment is best for you.  Protect your skin from too much sun. When you're outdoors from 10 a.m. to 4 p.m., stay in the shade or cover up with clothing and a hat with a wide brim. Wear sunglasses that block UV rays. Even when it's cloudy, put broad-spectrum sunscreen (SPF 30 or higher) on any exposed skin.  See a dentist one or two times a year for checkups and to have your teeth cleaned.  Wear a seat belt in the car. When should you call for help? Watch closely for changes in your health, and be sure to contact your doctor if you have any problems or symptoms that concern you. Where can you learn more? Go to [email or web address] [email or web address]![email or web address] org and sign in to your [employer] account. Enter P072 in the [facility] box to learn more about \"Well Visit, Ages 25 to 48: Care Instructions. \"     If you do not have an account, please click on the \"Sign Up Now\" link. Current as of: October 6, 2021               Content Version: 13.2  © 6971-5466 Healthwise, Incorporated. Care instructions adapted under license by [facility] ([facility]).  If you have questions about a medical condition or this instruction, always ask your healthcare professional. [address] any warranty or liability for your use of this information. Patient Education        Insomnia: Care Instructions  Overview     Insomnia is the inability to sleep well. Insomnia may make it hard for you to get to sleep, stay asleep, or sleep as long as you need to. This can make you tired and grouchy during the day. It can also make you forgetful, lesseffective at work, and unhappy. Insomnia can be linked to many things. These include health problems,medicines, and stressful events. Treatment may include treating problems that may be linked with your insomnia. Treatment also includes behavior and lifestyle changes. This may include cognitive-behavioral therapy for insomnia (CBT-I). CBT-I uses different ways to help you change your thoughts and behaviors that may interfere with sleep. Your doctor can recommend specific things you can try. Examples include doing relaxation exercises, keeping regular bedtimes and wake times, limiting alcohol, and making healthy sleep habits. Some people decide to take medicinefor a while to help with sleep. Follow-up care is a key part of your treatment and safety. Be sure to make and go to all appointments, and call your doctor if you are having problems. It's also a good idea to know your test results and keep alist of the medicines you take. How can you care for yourself at home? Cognitive-behavioral therapy for insomnia (CBT-I)   If your doctor recommends CBT-I, follow your treatment plan. Your doctor will give you instructions that are unique for you.  Your plan will likely include a few things that you can try at home. For example:  ? Try meditation or other relaxation techniques before you go to bed. ? Go to bed at the same time every night, and wake up at the same time every morning. Do not take naps during the day.   ? Do not stay in bed awake for too long. If you can't fall asleep, or if you wake up in the middle of the night and can't get back to sleep within about 15 to 20 minutes, get out of bed and go to another room until you feel sleepy. ? If watching the clock makes you anxious, turn it facing away from you so you cannot see the time. ? If you worry when you lie down, start a worry book. Well before bedtime, write down your worries, and then set the book and your concerns aside. Healthy sleep habits   If your doctor recommends it, try making healthy sleep habits. For example:  ? Keep your bedroom quiet, dark, and cool. ? Do not have drinks with caffeine, such as coffee or black tea, for 8 hours before bed. ? Do not smoke or use other types of tobacco near bedtime. Nicotine is a stimulant and can keep you awake. ? Avoid drinking alcohol late in the evening, because it can cause you to wake in the middle of the night. ? Do not eat a big meal close to bedtime. If you are hungry, eat a light snack. ? Do not drink a lot of water close to bedtime, because the need to urinate may wake you up during the night. ? Do not read, watch TV, or use your phone in bed. Use the bed only for sleeping and sex. Medicine   Be safe with medicines. Take your medicines exactly as prescribed. Call your doctor if you think you are having a problem with your medicine.  Talk with your doctor before you try an over-the-counter medicine, herbal product, or supplement to try to improve your sleep. Your doctor can recommend how much to take and when to take it. Make sure your doctor knows all of the medicines, vitamins, herbal products, and supplements you take.  You will get more details on the specific medicines your doctor prescribes. When should you call for help?   Watch closely for changes in your health, and be sure to contact your doctor if:     Your efforts to improve your sleep do not work.      Your insomnia gets worse.      You have been feeling down, depressed, or hopeless or have lost interest in things that you usually enjoy. Where can you learn more? Go to https://Firefly BioWorkspepiceweb.Wuiper. org and sign in to your [employer] account. Enter P513 in the [employer] box to learn more about \"Insomnia: Care Instructions. \"     If you do not have an account, please click on the \"Sign Up Now\" link. Current as of: June 16, 2021               Content Version: 13.2  © 2006-2022 Healthwise, Incorporated. Care instructions adapted under license by [facility] ([facility]). If you have questions about a medical condition or this instruction, always ask your healthcare professional. [address] any warranty or liability for your use of this information.

## 2022-04-05 NOTE — PROGRESS NOTES
Well Adult Note  Name: Samia Mccray Date: 2022   MRN: [id number] Sex: Female   Age: 55 y.o. Ethnicity: Non- / Non    : 1975 Race: Black / Angelina Perez is here for well adult exam.  History:    Patient presents for annual physical exam. Pap smear done on 20 and patient just saw Gynecology for annual in 2022. LMP 3/23/22.     Patient has iron deficiency anemia. Patient takes over the counter iron 1 tablet daily but not recently and it has been awhile. Patient increases green vegetables.     Patient is going to [facility] since 2021 after weight going up 199 lbs. Patient does a shot every morning for 6 weeks but is not sure of the name. Patient eats 500 calories per day 4.5 ounces of chicken 2 times per day, 100 calories of vegetables, and 100 calories of fruit. Patient has lost 30 lbs. Patient takes vitamin B12, potassium and magnesium.      Patient walks 30 minutes per day.     Patient has difficulty sleeping. Patient has difficulty falling and staying asleep. Patient tried Melatonin gummies but they were not working. Patient prefers to try something over the counter. Patient states her allergies flare up every now and then and she takes Flonase nasal spray. Patient declines colon cancer screening, Covid-19 vaccine, and hepatitis C screening. Review of Systems   Constitutional: Positive for appetite change and unexpected weight change. Negative for activity change, chills, diaphoresis, fatigue and fever. HENT: Negative for congestion, ear discharge, ear pain, facial swelling, hearing loss, mouth sores, nosebleeds, postnasal drip, rhinorrhea, sinus pressure, sinus pain, sneezing, sore throat, tinnitus, trouble swallowing and voice change. Eyes: Negative for photophobia, pain, discharge, redness, itching and visual disturbance.    Respiratory: Negative for apnea, cough, choking, chest tightness, shortness of breath and wheezing. Cardiovascular: Negative for chest pain, palpitations and leg swelling. Gastrointestinal: Negative for abdominal distention, abdominal pain, anal bleeding, blood in stool, constipation, diarrhea, nausea, rectal pain and vomiting. Endocrine: Negative for cold intolerance and heat intolerance. Genitourinary: Negative for decreased urine volume, difficulty urinating, dysuria, flank pain, frequency, genital sores, hematuria, menstrual problem, pelvic pain, urgency, vaginal bleeding, vaginal discharge and vaginal pain. Musculoskeletal: Negative for arthralgias, back pain, gait problem, joint swelling, myalgias, neck pain and neck stiffness. Skin: Negative for rash and wound. Neurological: Negative for dizziness, tremors, seizures, syncope, facial asymmetry, speech difficulty, weakness, light-headedness, numbness and headaches. Hematological: Negative for adenopathy. Does not bruise/bleed easily. Psychiatric/Behavioral: Positive for sleep disturbance. Negative for decreased concentration and dysphoric mood. The patient is not nervous/anxious. All other systems reviewed and are negative. No Known Allergies      Prior to Visit Medications    Medication Sig Taking? Authorizing Provider   fluticasone (FLONASE) 50 MCG/ACT nasal spray 1 spray by Nasal route as needed  Yes Historical Provider, MD         Past Medical History:   Diagnosis Date    Iron deficiency anemia        History reviewed. No pertinent surgical history.       Family History   Problem Relation Age of Onset    Pacemaker Mother     High Blood Pressure Mother     Sickle Cell Anemia Other         nephew has sickle cell    Diabetes Other         diabetes runs in family on mother's side    Cancer Brother 55        blood cancer    Brain Cancer Maternal Cousin 36       Social History     Tobacco Use    Smoking status: Never Smoker    Smokeless tobacco: Never Used   Substance Use Topics    Alcohol use: Yes     Comment: Occasional    Drug use: Never       Objective   /82   Pulse 66   Temp 97.5 °F (36.4 °C)   Resp 16   Ht 5' 2\" (1.575 m)   Wt 170 lb 3.2 oz (77.2 kg)   LMP 03/30/2022 (Within Days)   SpO2 99%   BMI 31.13 kg/m²   Wt Readings from Last 3 Encounters:   04/05/22 170 lb 3.2 oz (77.2 kg)   06/14/21 190 lb (86.2 kg)   05/10/21 190 lb (86.2 kg)     Additional Measurements    04/05/22 1307   Waist (Inches): 30.5 in         Physical Exam  Constitutional:       General: She is not in acute distress. Appearance: Normal appearance. HENT:      Head: Normocephalic and atraumatic. Right Ear: Tympanic membrane, ear canal and external ear normal.      Left Ear: Tympanic membrane, ear canal and external ear normal.      Nose: Nose normal.   Eyes:      General: Lids are normal.      Extraocular Movements: Extraocular movements intact. Conjunctiva/sclera: Conjunctivae normal.      Pupils: Pupils are equal, round, and reactive to light. Neck:      Thyroid: No thyromegaly. Vascular: No carotid bruit. Cardiovascular:      Rate and Rhythm: Normal rate and regular rhythm. Heart sounds: Normal heart sounds, S1 normal and S2 normal. No murmur heard. Pulmonary:      Effort: Pulmonary effort is normal.      Breath sounds: Normal breath sounds. Abdominal:      General: Bowel sounds are normal.      Palpations: Abdomen is soft. There is no hepatomegaly or splenomegaly. Tenderness: There is no abdominal tenderness. Musculoskeletal:      Right shoulder: No tenderness. Normal range of motion. Left shoulder: No tenderness. Normal range of motion. Right elbow: No tenderness. Left elbow: No tenderness. Right wrist: No swelling or tenderness. Left wrist: No swelling or tenderness. Right hand: No swelling or tenderness. Left hand: No swelling or tenderness. Cervical back: Neck supple. No tenderness. Thoracic back: No tenderness. Lumbar back: No tenderness. Normal range of motion. Right hip: No tenderness. Left hip: No tenderness. Right knee: No tenderness. Left knee: No tenderness. Right lower leg: No edema. Left lower leg: No edema. Right ankle: No swelling. No tenderness. Left ankle: No swelling. No tenderness. Right foot: No swelling. Left foot: No swelling. Lymphadenopathy:      Head:      Right side of head: No submandibular adenopathy. Left side of head: No submandibular adenopathy. Skin:     General: Skin is warm and dry. Neurological:      Mental Status: She is alert and oriented to person, place, and time. Gait: Gait normal.      Deep Tendon Reflexes: Reflexes are normal and symmetric. Psychiatric:         Attention and Perception: Attention normal.         Mood and Affect: Mood normal.         Speech: Speech normal.           Assessment   Plan   1. Annual physical exam  - Urinalysis  - CBC with Auto Differential; Future  - Comprehensive Metabolic Panel; Future  - Lipid Panel; Future  - TSH; Future  - screening mammogram   -Pap smear done on 5/20/20 and patient saw Gynecology for annual in February 2022  - Patient declines colon cancer screening  - Patient declines Covid-19 vaccine  - Tdap done on 3/29/13  - Regular aerobic exercise    2. Encounter for screening mammogram for breast cancer  - [facility] DIGITAL SCREEN W OR WO CAD BILATERAL; Future    3. Iron deficiency anemia, unspecified iron deficiency anemia type  - continue iron 1 tablet daily  - iron rich diet  - Iron and TIBC; Future    4. Primary insomnia  -start doxyLAMINE succinate (GNP SLEEP AID) 25 MG tablet; Take 1 tablet by mouth nightly as needed for Sleep    5. Screening for diabetes mellitus  - Hemoglobin A1C; Future          Personalized Preventive Plan   Current Health Maintenance Status    There is no immunization history on file for this patient.      Health Maintenance   Topic Date Due    Hepatitis C screen  Never done    A1C test (Diabetic or Prediabetic)  02/02/2011    Lipids  11/26/2015    Colorectal Cancer Screen  Never done    COVID-19 Vaccine (1) 04/05/2023 (Originally 11/26/1980)    Flu vaccine (Season Ended) 09/01/2022    DTaP/Tdap/Td vaccine (2 - Td or Tdap) 03/29/2023    Depression Screen  04/05/2023    Cervical cancer screen  05/20/2025    HIV screen  Completed    Hepatitis A vaccine  Aged Out    Hepatitis B vaccine  Aged Out    Hib vaccine  Aged Out    Meningococcal (ACWY) vaccine  Aged Out    Pneumococcal 0-64 years Vaccine  Aged Out     Recommendations for [employer] Due: see orders and patient instructions/AVS.    Return in about 1 year (around 4/5/2023) for annual physical exam.

## 2022-04-19 PROBLEM — F51.01 PRIMARY INSOMNIA: Status: ACTIVE | Noted: 2022-04-19
